# Patient Record
Sex: MALE | Race: BLACK OR AFRICAN AMERICAN | NOT HISPANIC OR LATINO | Employment: FULL TIME | URBAN - METROPOLITAN AREA
[De-identification: names, ages, dates, MRNs, and addresses within clinical notes are randomized per-mention and may not be internally consistent; named-entity substitution may affect disease eponyms.]

---

## 2019-10-11 ENCOUNTER — APPOINTMENT (EMERGENCY)
Dept: RADIOLOGY | Facility: HOSPITAL | Age: 34
End: 2019-10-11
Payer: COMMERCIAL

## 2019-10-11 ENCOUNTER — HOSPITAL ENCOUNTER (EMERGENCY)
Facility: HOSPITAL | Age: 34
Discharge: HOME | End: 2019-10-11
Attending: EMERGENCY MEDICINE
Payer: COMMERCIAL

## 2019-10-11 VITALS
HEIGHT: 73 IN | OXYGEN SATURATION: 95 % | HEART RATE: 83 BPM | DIASTOLIC BLOOD PRESSURE: 103 MMHG | BODY MASS INDEX: 29.16 KG/M2 | WEIGHT: 220 LBS | SYSTOLIC BLOOD PRESSURE: 147 MMHG | RESPIRATION RATE: 16 BRPM | TEMPERATURE: 98.4 F

## 2019-10-11 DIAGNOSIS — M79.672 LEFT FOOT PAIN: Primary | ICD-10-CM

## 2019-10-11 PROCEDURE — 99284 EMERGENCY DEPT VISIT MOD MDM: CPT | Performed by: EMERGENCY MEDICINE

## 2019-10-11 PROCEDURE — 99283 EMERGENCY DEPT VISIT LOW MDM: CPT

## 2019-10-11 PROCEDURE — 73630 X-RAY EXAM OF FOOT: CPT

## 2019-10-11 RX ORDER — IBUPROFEN 600 MG/1
600 TABLET ORAL ONCE
Status: COMPLETED | OUTPATIENT
Start: 2019-10-11 | End: 2019-10-11

## 2019-10-11 RX ADMIN — IBUPROFEN 600 MG: 600 TABLET ORAL at 01:03

## 2019-10-11 NOTE — DISCHARGE INSTRUCTIONS
Wear shoes with good support when walking for prolonged periods of time  Elevate your foot, and apply ice at the end of the day  Stretch and massage your foot to help keep the tendons and ligaments loose  Take ibuprofen (Motrin, Advil) or acetaminophen (Tylenol) as needed for pain, as per the instructions  Follow up with a podiatrist for further evaluation

## 2019-10-11 NOTE — ED PROVIDER NOTES
History  Chief Complaint   Patient presents with    Foot Pain     pt co of L foot pain onset "months ago " denies injury      HPI    None       Past Medical History:   Diagnosis Date    Hypertension        History reviewed  No pertinent surgical history  History reviewed  No pertinent family history  I have reviewed and agree with the history as documented  Social History     Tobacco Use    Smoking status: Former Smoker    Smokeless tobacco: Never Used   Substance Use Topics    Alcohol use: Not Currently    Drug use: Yes     Types: Marijuana        Review of Systems    Physical Exam  Physical Exam   Constitutional: He is oriented to person, place, and time  He appears well-developed and well-nourished  No distress  HENT:   Head: Normocephalic and atraumatic  Eyes: Pupils are equal, round, and reactive to light  Conjunctivae are normal    Neck: Normal range of motion  No tracheal deviation present  Cardiovascular: Normal rate, regular rhythm and intact distal pulses  Pulses:       Dorsalis pedis pulses are 2+ on the left side  Posterior tibial pulses are 2+ on the left side  Pulmonary/Chest: Effort normal  No respiratory distress  Musculoskeletal:        Left foot: There is tenderness (mild) and bony tenderness (5th MT)  There is normal range of motion, no swelling, normal capillary refill, no crepitus and no deformity  Feet:    Neurological: He is alert and oriented to person, place, and time  GCS eye subscore is 4  GCS verbal subscore is 5  GCS motor subscore is 6  Skin: Skin is warm and dry  Psychiatric: He has a normal mood and affect  His behavior is normal    Nursing note and vitals reviewed        Vital Signs  ED Triage Vitals   Temperature Pulse Respirations Blood Pressure SpO2   10/11/19 0026 10/11/19 0025 10/11/19 0025 10/11/19 0025 10/11/19 0025   98 4 °F (36 9 °C) 83 16 (!) 147/103 95 %      Temp Source Heart Rate Source Patient Position - Orthostatic VS BP Location FiO2 (%)   10/11/19 0026 10/11/19 0025 10/11/19 0025 10/11/19 0025 --   Oral Monitor Sitting Right arm       Pain Score       10/11/19 0025       8           Vitals:    10/11/19 0025   BP: (!) 147/103   Pulse: 83   Patient Position - Orthostatic VS: Sitting         Visual Acuity      ED Medications  Medications   ibuprofen (MOTRIN) tablet 600 mg (600 mg Oral Given 10/11/19 0103)       Diagnostic Studies  Results Reviewed     None                 XR foot 3+ views LEFT    (Results Pending)              Procedures  Procedures       ED Course                               MDM  Number of Diagnoses or Management Options  Left foot pain: new and requires workup  Diagnosis management comments: This is a 29-year-old male with no known medical problems who presents here today with left foot pain  He states it has been intermittent over the past several months, where he will admit for up to a couple of days, and then have resolution for several days before recurrence  The pain is diffusely throughout the midfoot, worse on the medial side  There is no radiation of pain outside of this area  He states it tends to be worse at the end of the day after work  He does work as a , so is on his feet all day long  He denies any known injuries to his foot, or prior problems with the foot  He states it acutely worsened yesterday  He tried applying an Ace wrap, but states the foot hurt worse afterwards  He took one Tylenol yesterday without improvement of the pain  He has not otherwise taken or done anything for the pain since it began hurting  He has not seen anybody for the foot before today  Review of systems:  Otherwise negative unless stated as above    He is well-appearing, in no acute distress  He has mild tenderness to the medial portion of the foot, more so along the fifth metatarsal   There is no significant tenderness to the plantar surface  Exam is otherwise unremarkable    This is most likely tendon or ligament strain, possible plantar fasciitis, though with tenderness over the fifth metatarsal we will get an x-ray to evaluate for underlying bony injuries, particularly stress fractures  X-ray was reviewed by myself and shows no acute abnormalities  Discussed with patient findings, treatment at home, follow-up, and indications for return, and he expresses understanding with this plan  Amount and/or Complexity of Data Reviewed  Tests in the radiology section of CPT®: ordered and reviewed  Independent visualization of images, tracings, or specimens: yes        Disposition  Final diagnoses:   Left foot pain     Time reflects when diagnosis was documented in both MDM as applicable and the Disposition within this note     Time User Action Codes Description Comment    10/11/2019  1:18 AM Liz Galan Add [J16 [id number]] Left foot pain       ED Disposition     ED Disposition Condition Date/Time Comment    Discharge Good Fri Oct 11, 2019  1:17 AM Carol Heredia discharge to home/self care  Follow-up Information     Follow up With Specialties Details Why Contact Info [facility]  Schedule an appointment as soon as possible for a visit  to follow up on your foot [address]  [address]    Joe Velasquez DPM Podiatry Schedule an appointment as soon as possible for a visit  to follow up on your foot [address] [address]  [phone]            Patient's Medications    No medications on file     No discharge procedures on file      ED Provider  Electronically Signed by           Brad Salas MD  10/11/19 6991

## 2019-10-11 NOTE — ED NOTES
X-ray at bedside     David Coon, [facility]0 [facility]  10/11/19 0101